# Patient Record
Sex: MALE | Race: WHITE | NOT HISPANIC OR LATINO | Employment: OTHER | URBAN - METROPOLITAN AREA
[De-identification: names, ages, dates, MRNs, and addresses within clinical notes are randomized per-mention and may not be internally consistent; named-entity substitution may affect disease eponyms.]

---

## 2021-02-10 ENCOUNTER — AMBULATORY - HEALTHEAST (OUTPATIENT)
Dept: NURSING | Facility: CLINIC | Age: 81
End: 2021-02-10

## 2021-03-03 ENCOUNTER — AMBULATORY - HEALTHEAST (OUTPATIENT)
Dept: NURSING | Facility: CLINIC | Age: 81
End: 2021-03-03

## 2023-09-18 ENCOUNTER — OFFICE VISIT (OUTPATIENT)
Dept: FAMILY MEDICINE | Facility: CLINIC | Age: 83
End: 2023-09-18
Payer: COMMERCIAL

## 2023-09-18 ENCOUNTER — ANCILLARY PROCEDURE (OUTPATIENT)
Dept: GENERAL RADIOLOGY | Facility: CLINIC | Age: 83
End: 2023-09-18
Attending: PHYSICIAN ASSISTANT
Payer: COMMERCIAL

## 2023-09-18 VITALS
RESPIRATION RATE: 16 BRPM | SYSTOLIC BLOOD PRESSURE: 168 MMHG | DIASTOLIC BLOOD PRESSURE: 92 MMHG | HEART RATE: 76 BPM | OXYGEN SATURATION: 96 % | TEMPERATURE: 98.2 F

## 2023-09-18 DIAGNOSIS — S29.9XXA TRAUMATIC INJURY OF RIB: ICD-10-CM

## 2023-09-18 DIAGNOSIS — S22.32XA CLOSED FRACTURE OF ONE RIB OF LEFT SIDE, INITIAL ENCOUNTER: Primary | ICD-10-CM
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
## 2023-09-18 PROCEDURE — 71101 X-RAY EXAM UNILAT RIBS/CHEST: CPT | Mod: TC | Performed by: RADIOLOGY

## 2023-09-18 PROCEDURE — 99204 OFFICE O/P NEW MOD 45 MIN: CPT | Performed by: PHYSICIAN ASSISTANT

## 2023-09-18 RX ORDER — CAPSAICIN 0.025 %
1 CREAM (GRAM) TOPICAL 3 TIMES DAILY PRN
Qty: 118 ML | Refills: 0 | Status: SHIPPED | OUTPATIENT
Start: 2023-09-18

## 2023-09-18 RX ORDER — OXYCODONE AND ACETAMINOPHEN 5; 325 MG/1; MG/1
1 TABLET ORAL EVERY 6 HOURS PRN
Qty: 10 TABLET | Refills: 0 | Status: SHIPPED | OUTPATIENT
Start: 2023-09-18 | End: 2023-09-21

## 2023-09-18 RX ORDER — LIDOCAINE 4 G/G
1 PATCH TOPICAL EVERY 24 HOURS
Qty: 15 PATCH | Refills: 0 | Status: SHIPPED | OUTPATIENT
Start: 2023-09-18

## 2023-09-18 RX ORDER — POLYETHYLENE GLYCOL 3350 17 G/17G
1 POWDER, FOR SOLUTION ORAL DAILY
Qty: 255 G | Refills: 0 | Status: SHIPPED | OUTPATIENT
Start: 2023-09-18

## 2023-09-18 NOTE — PROGRESS NOTES
Chief Complaint   Patient presents with    Back Pain     Fell on 9/16  has left side rib pain since       ASSESSMENT/PLAN:  Sreedhar was seen today for back pain.    Diagnoses and all orders for this visit:    Closed fracture of one rib of left side, initial encounter  -     oxyCODONE-acetaminophen (PERCOCET) 5-325 MG tablet; Take 1 tablet by mouth every 6 hours as needed for severe pain or breakthrough pain  -     Lidocaine (LIDOCARE) 4 % Patch; Place 1 patch onto the skin every 24 hours To prevent lidocaine toxicity, patient should be patch free for 12 hrs daily.  -     capsaicin (ZOSTRIX) 0.025 % external cream; Apply 1 g topically 3 times daily as needed (pain)  -     polyethylene glycol (MIRALAX) 17 GM/Dose powder; Take 17 g (1 Capful) by mouth daily    Fall, initial encounter    Traumatic injury of rib  -     XR Ribs & Chest Left G/E 3 Views; Future    Fracture noted on x-ray.  Did not hit his head.  No evidence of pneumonia or pneumothorax.  Has been on Percocet before for pain control    rib injuries and fractures can take 6+ weeks to heal.  It is very important need to take several deep breaths throughout each day to reduce the risk of pneumonia.  For your pain use the lidocaine patches, capsaicin cream and can also use 500 to 1000 mg of Tylenol 3 times a day.  For breakthrough pain you can use the Norco I have sent into the pharmacy for you.  Be careful taking this medication.  It can cause sedation, nausea, constipation among other side effects that we discussed  Do not take more than  3000mg of tylenol in a day  If you end up taking any of the Percocet please take MiraLAX as well to prevent constipation    Rodolfo Marino PA-C  40 minutes spent by me on the date of the encounter doing chart review, history and exam, documentation and further activities per the note    SUBJECTIVE:  Sreedhar is a 83 year old male who presents to urgent care with pain in the left side.  He fell while working in the garage  onto his left side.  He did not hit his head.  He has been in pain since.  Hurts with deep breathing, sneezing or certain movements.  No shortness of breath, no abdominal pain, nausea, no blood in the urine or the stool.    ROS: Pertinent ROS neg other than the symptoms noted above in the HPI.     OBJECTIVE:  BP (!) 168/92   Pulse 76   Temp 98.2  F (36.8  C) (Oral)   Resp 16   SpO2 96%    GENERAL: healthy, alert and no distress  RESP: lungs clear to auscultation - no rales, rhonchi or wheezes  CV: regular rate and rhythm, normal S1 S2, no S3 or S4, no murmur, click or rub, no peripheral edema and peripheral pulses strong  MS: Soft tissue swelling of the left lower side with focal tenderness, no obvious step-offs or crepitus   SKIN: no suspicious lesions or rashes  NEURO: Normal strength and tone, mentation intact and speech normal    DIAGNOSTICS  Xray - Reviewed and interpreted by me.  Minimally displaced fracture of the left 12th rib  No results found for any visits on 09/18/23.     Current Outpatient Medications   Medication    aspirin 81 mg chewable tablet    tamsulosin (FLOMAX) 0.4 mg Cp24    docusate sodium (COLACE) 100 MG capsule    glycerin, adult, Supp rectal suppository    oxyCODONE-acetaminophen (PERCOCET) 5-325 mg per tablet    polyethylene glycol (GLYCOLAX) 17 gram/dose powder     No current facility-administered medications for this visit.      Patient Active Problem List   Diagnosis    Kidney filling defect      No past medical history on file.  Past Surgical History:   Procedure Laterality Date    ABDOMINAL AORTIC ANEURYSM REPAIR  2009    ARTHROSCOPY KNEE Left     ARTHROSCOPY SHOULDER ROTATOR CUFF REPAIR Bilateral     LUMBAR SPINE SURGERY      X3    OTHER SURGICAL HISTORY  2012    robotic right nephroureterectomy    PATENT FORAMEN OVALE CLOSURE  2005     No family history on file.  Social History     Tobacco Use    Smoking status: Some Days    Smokeless tobacco: Not on file   Substance Use Topics     Alcohol use: Yes     Comment: Alcoholic Drinks/day: rarely              The plan of care was discussed with the patient. They understand and agree with the course of treatment prescribed. A printed summary was given including instructions and medications.  The use of Dragon/[employer] dictation services may have been used to construct the content in this note; any grammatical or spelling errors are non-intentional. Please contact the author of this note directly if you are in need of any clarification.

## 2023-09-18 NOTE — PATIENT INSTRUCTIONS
rib injuries and fractures can take 6 weeks to heal.  It is very important need to take several deep breaths throughout each day to reduce the risk of pneumonia.  For your pain use the lidocaine patches, capsaicin cream and can also use 500 to 1000 mg of Tylenol 3 times a day.  For breakthrough pain you can use the percocet I have sent into the pharmacy for you.  Be careful taking this medication.  It can cause sedation, nausea, constipation among other side effects that we discussed  Do not take more than  3000mg of tylenol in a day